# Patient Record
Sex: MALE | URBAN - METROPOLITAN AREA
[De-identification: names, ages, dates, MRNs, and addresses within clinical notes are randomized per-mention and may not be internally consistent; named-entity substitution may affect disease eponyms.]

---

## 2024-11-18 ENCOUNTER — HOSPITAL ENCOUNTER (INPATIENT)
Age: 19
LOS: 4 days | Discharge: HOME | End: 2024-11-22
Attending: EMERGENCY MEDICINE | Admitting: PSYCHIATRY & NEUROLOGY
Payer: MEDICAID

## 2024-11-18 DIAGNOSIS — R45.851 DEPRESSION WITH SUICIDAL IDEATION: Primary | ICD-10-CM

## 2024-11-18 DIAGNOSIS — F32.A DEPRESSION WITH SUICIDAL IDEATION: Primary | ICD-10-CM

## 2024-11-18 LAB
ALBUMIN SERPL-MCNC: 4.1 G/DL (ref 3.5–5.2)
ALP SERPL-CCNC: 51 U/L (ref 40–129)
ALT SERPL-CCNC: 40 U/L (ref 10–50)
AMPHET UR QL SCN: NEGATIVE
ANION GAP SERPL CALCULATED.3IONS-SCNC: 11 MMOL/L (ref 9–16)
APAP SERPL-MCNC: <5 UG/ML (ref 10–30)
AST SERPL-CCNC: 36 U/L (ref 10–50)
BARBITURATES UR QL SCN: NEGATIVE
BASOPHILS # BLD: 0 K/UL (ref 0–0.2)
BASOPHILS NFR BLD: 1 % (ref 0–2)
BENZODIAZ UR QL: NEGATIVE
BILIRUB SERPL-MCNC: <0.2 MG/DL (ref 0–1.2)
BUN SERPL-MCNC: 15 MG/DL (ref 6–20)
CALCIUM SERPL-MCNC: 9.5 MG/DL (ref 8.6–10.4)
CANNABINOIDS UR QL SCN: NEGATIVE
CHLORIDE SERPL-SCNC: 104 MMOL/L (ref 98–107)
CO2 SERPL-SCNC: 23 MMOL/L (ref 20–31)
COCAINE UR QL SCN: NEGATIVE
CREAT SERPL-MCNC: 0.8 MG/DL (ref 0.7–1.2)
EOSINOPHIL # BLD: 0.3 K/UL (ref 0–0.4)
EOSINOPHILS RELATIVE PERCENT: 4 % (ref 0–4)
ERYTHROCYTE [DISTWIDTH] IN BLOOD BY AUTOMATED COUNT: 13.6 % (ref 11.5–14.9)
ETHANOL PERCENT: NORMAL %
ETHANOLAMINE SERPL-MCNC: <10 MG/DL (ref 0–0.08)
FENTANYL UR QL: NEGATIVE
GFR, ESTIMATED: >90 ML/MIN/1.73M2
GLUCOSE SERPL-MCNC: 105 MG/DL (ref 74–99)
HCT VFR BLD AUTO: 41.7 % (ref 41–53)
HGB BLD-MCNC: 14.4 G/DL (ref 13.5–17.5)
LYMPHOCYTES NFR BLD: 2.6 K/UL (ref 1–4.8)
LYMPHOCYTES RELATIVE PERCENT: 38 % (ref 25–45)
MAGNESIUM SERPL-MCNC: 2 MG/DL (ref 1.6–2.6)
MCH RBC QN AUTO: 31.1 PG (ref 26–34)
MCHC RBC AUTO-ENTMCNC: 34.5 G/DL (ref 31–37)
MCV RBC AUTO: 90.2 FL (ref 80–100)
METHADONE UR QL: NEGATIVE
MONOCYTES NFR BLD: 0.5 K/UL (ref 0.1–1.3)
MONOCYTES NFR BLD: 7 % (ref 2–8)
NEUTROPHILS NFR BLD: 50 % (ref 34–64)
NEUTS SEG NFR BLD: 3.4 K/UL (ref 1.3–9.1)
OPIATES UR QL SCN: NEGATIVE
OXYCODONE UR QL SCN: NEGATIVE
PCP UR QL SCN: NEGATIVE
PLATELET # BLD AUTO: 299 K/UL (ref 150–450)
PMV BLD AUTO: 8.3 FL (ref 6–12)
POTASSIUM SERPL-SCNC: 4.2 MMOL/L (ref 3.7–5.3)
PROT SERPL-MCNC: 7.7 G/DL (ref 6.6–8.7)
RBC # BLD AUTO: 4.62 M/UL (ref 4.5–5.9)
SALICYLATES SERPL-MCNC: <1 MG/DL (ref 0–10)
SODIUM SERPL-SCNC: 138 MMOL/L (ref 136–145)
TEST INFORMATION: NORMAL
WBC OTHER # BLD: 6.8 K/UL (ref 4.5–13.5)

## 2024-11-18 PROCEDURE — 85025 COMPLETE CBC W/AUTO DIFF WBC: CPT

## 2024-11-18 PROCEDURE — 1240000000 HC EMOTIONAL WELLNESS R&B

## 2024-11-18 PROCEDURE — 80179 DRUG ASSAY SALICYLATE: CPT

## 2024-11-18 PROCEDURE — 80143 DRUG ASSAY ACETAMINOPHEN: CPT

## 2024-11-18 PROCEDURE — 80307 DRUG TEST PRSMV CHEM ANLYZR: CPT

## 2024-11-18 PROCEDURE — 80053 COMPREHEN METABOLIC PANEL: CPT

## 2024-11-18 PROCEDURE — 83735 ASSAY OF MAGNESIUM: CPT

## 2024-11-18 PROCEDURE — 99285 EMERGENCY DEPT VISIT HI MDM: CPT

## 2024-11-18 PROCEDURE — G0480 DRUG TEST DEF 1-7 CLASSES: HCPCS

## 2024-11-18 PROCEDURE — 36415 COLL VENOUS BLD VENIPUNCTURE: CPT

## 2024-11-18 RX ORDER — DIPHENHYDRAMINE HYDROCHLORIDE 50 MG/ML
50 INJECTION INTRAMUSCULAR; INTRAVENOUS EVERY 6 HOURS PRN
Status: DISCONTINUED | OUTPATIENT
Start: 2024-11-18 | End: 2024-11-22 | Stop reason: HOSPADM

## 2024-11-18 RX ORDER — TRAZODONE HYDROCHLORIDE 50 MG/1
50 TABLET, FILM COATED ORAL NIGHTLY PRN
Status: DISCONTINUED | OUTPATIENT
Start: 2024-11-18 | End: 2024-11-22 | Stop reason: HOSPADM

## 2024-11-18 RX ORDER — POLYETHYLENE GLYCOL 3350 17 G/17G
17 POWDER, FOR SOLUTION ORAL DAILY PRN
Status: DISCONTINUED | OUTPATIENT
Start: 2024-11-18 | End: 2024-11-22 | Stop reason: HOSPADM

## 2024-11-18 RX ORDER — IBUPROFEN 400 MG/1
400 TABLET, FILM COATED ORAL EVERY 6 HOURS PRN
Status: DISCONTINUED | OUTPATIENT
Start: 2024-11-18 | End: 2024-11-22 | Stop reason: HOSPADM

## 2024-11-18 RX ORDER — POLYETHYLENE GLYCOL 3350 17 G
2 POWDER IN PACKET (EA) ORAL
Status: DISCONTINUED | OUTPATIENT
Start: 2024-11-18 | End: 2024-11-18

## 2024-11-18 RX ORDER — MAGNESIUM HYDROXIDE/ALUMINUM HYDROXICE/SIMETHICONE 120; 1200; 1200 MG/30ML; MG/30ML; MG/30ML
30 SUSPENSION ORAL EVERY 6 HOURS PRN
Status: DISCONTINUED | OUTPATIENT
Start: 2024-11-18 | End: 2024-11-22 | Stop reason: HOSPADM

## 2024-11-18 RX ORDER — LORAZEPAM 2 MG/ML
2 INJECTION INTRAMUSCULAR EVERY 6 HOURS PRN
Status: DISCONTINUED | OUTPATIENT
Start: 2024-11-18 | End: 2024-11-22 | Stop reason: HOSPADM

## 2024-11-18 RX ORDER — LORAZEPAM 1 MG/1
2 TABLET ORAL EVERY 6 HOURS PRN
Status: DISCONTINUED | OUTPATIENT
Start: 2024-11-18 | End: 2024-11-22 | Stop reason: HOSPADM

## 2024-11-18 RX ORDER — HALOPERIDOL 5 MG/1
5 TABLET ORAL EVERY 6 HOURS PRN
Status: DISCONTINUED | OUTPATIENT
Start: 2024-11-18 | End: 2024-11-22 | Stop reason: HOSPADM

## 2024-11-18 RX ORDER — HALOPERIDOL 5 MG/ML
5 INJECTION INTRAMUSCULAR EVERY 6 HOURS PRN
Status: DISCONTINUED | OUTPATIENT
Start: 2024-11-18 | End: 2024-11-22 | Stop reason: HOSPADM

## 2024-11-18 RX ORDER — ACETAMINOPHEN 325 MG/1
650 TABLET ORAL EVERY 6 HOURS PRN
Status: DISCONTINUED | OUTPATIENT
Start: 2024-11-18 | End: 2024-11-22 | Stop reason: HOSPADM

## 2024-11-18 RX ORDER — HYDROXYZINE HYDROCHLORIDE 50 MG/1
50 TABLET, FILM COATED ORAL 3 TIMES DAILY PRN
Status: DISCONTINUED | OUTPATIENT
Start: 2024-11-18 | End: 2024-11-22 | Stop reason: HOSPADM

## 2024-11-18 ASSESSMENT — PATIENT HEALTH QUESTIONNAIRE - PHQ9
SUM OF ALL RESPONSES TO PHQ QUESTIONS 1-9: 2
1. LITTLE INTEREST OR PLEASURE IN DOING THINGS: SEVERAL DAYS
2. FEELING DOWN, DEPRESSED OR HOPELESS: SEVERAL DAYS
SUM OF ALL RESPONSES TO PHQ QUESTIONS 1-9: 2
SUM OF ALL RESPONSES TO PHQ9 QUESTIONS 1 & 2: 2

## 2024-11-18 ASSESSMENT — ENCOUNTER SYMPTOMS
SHORTNESS OF BREATH: 0
COLOR CHANGE: 0
BACK PAIN: 0
EYE PAIN: 0
ABDOMINAL PAIN: 0

## 2024-11-18 ASSESSMENT — PAIN - FUNCTIONAL ASSESSMENT: PAIN_FUNCTIONAL_ASSESSMENT: NONE - DENIES PAIN

## 2024-11-18 ASSESSMENT — LIFESTYLE VARIABLES
HOW MANY STANDARD DRINKS CONTAINING ALCOHOL DO YOU HAVE ON A TYPICAL DAY: PATIENT DOES NOT DRINK
HOW OFTEN DO YOU HAVE A DRINK CONTAINING ALCOHOL: NEVER

## 2024-11-18 ASSESSMENT — SLEEP AND FATIGUE QUESTIONNAIRES
DO YOU USE A SLEEP AID: NO
DO YOU HAVE DIFFICULTY SLEEPING: NO
AVERAGE NUMBER OF SLEEP HOURS: 6

## 2024-11-18 NOTE — ED NOTES
Provisional Diagnosis:   Depression with SI     Psychosocial and Contextual Factors:   Patient presents at the ED via [employer] on a pink slip due to suicidal ideations.    C-SSRS Summary:  X    Patient: X  Family:    Agency: X    Substance Abuse: Pt denies    Present Suicidal Behavior:  X    Verbal: X    Attempt:     Past Suicidal Behavior: X    Verbal:X    Attempt:X      Self-Injurious/Self-Mutilation:       Violence Current or Past        Trauma Identified:       Protective Factors:           Risk Factors:           Clinical Summary:    Soren Allen is a 19 y.o. male who presents at the ED via [employer] on a pink slip due to suicidal ideations.    Per pink slip,   \"Clt has been screened by crisis 3X in past 24 hours due to severe depression and suicidal ideations. Clt was seen by NP of [facility] psychiatric urgent care and hospitalization was recommended. Clt presents with anhedonia, worthlessness, hopelessness, and helpless as he does not believe his mood circumstances will ever improve and expressing that he wants to die. Clt identified two methods that are highly lethal - collision w/ train and shooting self w/ firearm. While clt does not possess a firearm [place] train tracks. Clt expressed that he is likely to act on suicidal urges in near future and was unable to identity reasons for living, available supports, and alternative behaviors. Clt is concerned how inpatient tx may impact school/work and has motive to minimize severity of risk. Clt has hx of three prior attempts with last occurring in 07/2024 and didn't continue with MH tx after admission to [facility] then. Clt states there is no point is continuing to suffer as he does not believe positive change can occur. Clt is assessed as high risk to self and hospitalization is recommended.\"     Patient reports suicidal ideations. Patient stated he would use a gun or jump in front of a train to kill himself. Patient reports he has been having increase  depression for the last two months and reported suicidal ideations on and off but has not been able to cope anymore. Patient called police. Patient reports that he has some personal things going on and did not want to discuss with ED physician and SW.     Patient denies taking any psychiatric medication and denies following with any outpatient mental health provider. Patient stated he saw a therapist around 2 months ago that was in private practice.     Patient goes to [employer] for college. Patient from [place] originally.     Patient has never been admitted to [facility]. Patient reports being admitted somewhere else in the past from an attempt of cutting.     Patient presents with a low affect.     Patient denies homicidal ideations. Patient denies auditory and visual hallucinations.     Level of Care Disposition:    Writer consulted with Dr. Canales who recommends inpatient psychiatric admission for safety and stabilization. Patient admitted via pink slip.

## 2024-11-18 NOTE — ED PROVIDER NOTES
EMERGENCY DEPARTMENT ENCOUNTER    Pt Name: Soren Allen  MRN: [id number]  Birthdate 2005  Date of evaluation: 11/18/24  CHIEF COMPLAINT       Chief Complaint   Patient presents with    Mental Health Problem     HISTORY OF PRESENT ILLNESS   19-year-old male presents for mental health evaluation.  Patient reports worsening depression recently states that 3 due to personal issues states that he has been feeling suicidal had a plan to jump in front of a train.  He denies homicidal ideation denies visual or auditory elucidation's denies recent drug or alcohol use denies any current medical complaints    The history is provided by the patient.           REVIEW OF SYSTEMS     Review of Systems   Constitutional:  Negative for chills and fever.   HENT:  Negative for congestion and ear pain.    Eyes:  Negative for pain.   Respiratory:  Negative for shortness of breath.    Cardiovascular:  Negative for chest pain, palpitations and leg swelling.   Gastrointestinal:  Negative for abdominal pain.   Genitourinary:  Negative for dysuria and flank pain.   Musculoskeletal:  Negative for back pain.   Skin:  Negative for color change.   Neurological:  Negative for numbness and headaches.   Psychiatric/Behavioral:  Positive for suicidal ideas. Negative for confusion.    All other systems reviewed and are negative.    PASTMEDICAL HISTORY   History reviewed. No pertinent past medical history.  Past Problem List  Patient Active Problem List   Diagnosis Code    Depression with suicidal ideation F32.A, R45.851     SURGICAL HISTORY     History reviewed. No pertinent surgical history.  CURRENT MEDICATIONS       There are no discharge medications for this patient.    ALLERGIES     has No Known Allergies.  FAMILY HISTORY     has no family status information on file.      SOCIAL HISTORY       Social History     Tobacco Use    Smoking status: Never    Smokeless tobacco: Never   Vaping Use    Vaping status: Never Used   Substance Use Topics

## 2024-11-19 PROBLEM — F33.2 SEVERE EPISODE OF RECURRENT MAJOR DEPRESSIVE DISORDER, WITHOUT PSYCHOTIC FEATURES (HCC): Status: ACTIVE | Noted: 2024-11-19

## 2024-11-19 PROCEDURE — 6370000000 HC RX 637 (ALT 250 FOR IP): Performed by: PSYCHIATRY & NEUROLOGY

## 2024-11-19 PROCEDURE — 99223 1ST HOSP IP/OBS HIGH 75: CPT | Performed by: PSYCHIATRY & NEUROLOGY

## 2024-11-19 PROCEDURE — 99222 1ST HOSP IP/OBS MODERATE 55: CPT | Performed by: INTERNAL MEDICINE

## 2024-11-19 PROCEDURE — 1240000000 HC EMOTIONAL WELLNESS R&B

## 2024-11-19 PROCEDURE — APPSS60 APP SPLIT SHARED TIME 46-60 MINUTES: Performed by: NURSE PRACTITIONER

## 2024-11-19 RX ADMIN — TRAZODONE HYDROCHLORIDE 50 MG: 50 TABLET ORAL at 21:11

## 2024-11-19 RX ADMIN — HYDROXYZINE HYDROCHLORIDE 50 MG: 50 TABLET, FILM COATED ORAL at 21:11

## 2024-11-19 RX ADMIN — SERTRALINE 50 MG: 50 TABLET, FILM COATED ORAL at 15:17

## 2024-11-19 ASSESSMENT — LIFESTYLE VARIABLES
HOW MANY STANDARD DRINKS CONTAINING ALCOHOL DO YOU HAVE ON A TYPICAL DAY: PATIENT DOES NOT DRINK
HOW OFTEN DO YOU HAVE A DRINK CONTAINING ALCOHOL: NEVER
HOW MANY STANDARD DRINKS CONTAINING ALCOHOL DO YOU HAVE ON A TYPICAL DAY: PATIENT DOES NOT DRINK
HOW OFTEN DO YOU HAVE A DRINK CONTAINING ALCOHOL: NEVER

## 2024-11-19 NOTE — PLAN OF CARE
Problem: Risk for Elopement  Goal: Patient will not exit the unit/facility without proper excort  Note: Mr. Allen has made no attempts to elope during this shift. He has remained isolative to his room throughout this shift.      Problem: Depression/Self Harm  Goal: Effect of psychiatric condition will be minimized and patient will be protected from self harm  Description: INTERVENTIONS:  1. Assess impact of patient's symptoms on level of functioning, self care needs and offer support as indicated  2. Assess patient/family knowledge of depression, impact on illness and need for teaching  3. Provide emotional support, presence and reassurance  4. Assess for possible suicidal thoughts or ideation. If patient expresses suicidal thoughts or statements do not leave alone, initiate Suicide Precautions, move to a room close to the nursing station and obtain sitter  5. Initiate consults as appropriate with Mental Health Professional, Spiritual Care, Psychosocial CNS, and consider a recommendation to the LIP for a Psychiatric Consultation  Note: Mr. Allen endorses feeling depressed. He was accepting of the Zoloft he was prescribed. Writer educated Mr. Allen about medication, he verbalized understanding.      Problem: Pain  Goal: Verbalizes/displays adequate comfort level or baseline comfort level  Note: Patient denies pain and discomfort.

## 2024-11-19 NOTE — PLAN OF CARE
[facility]  Initial Interdisciplinary Treatment Plan NO      Original treatment plan Date & Time: 11/19/2024 0930    Admission Type:  Admission Type: Involuntary    Reason for admission:   Reason for Admission: Patient came in pink slipped from [facility], signed in on admit, patient has been feeling suicidal with a plan to use a gun or get hit by a train. Patient reports being unable to cope and also admits to homicidal ideation toward no one in specific. Patient has had multiple attempts in the past and has history of cutting self.    Estimated Length of Stay:  5-7days  Estimated Discharge Date: to be determined by physician    PATIENT STRENGTHS:  Patient Strengths:   Patient Strengths and Limitations:Limitations: General negative or hopeless attitude about future/recovery, Difficulty problem solving/relies on others to help solve problems  Addictive Behavior: Addictive Behavior  In the Past 3 Months, Have You Felt or Has Someone Told You That You Have a Problem With  : None  Medical Problems:History reviewed. No pertinent past medical history.  Status EXAM:Mental Status and Behavioral Exam  Normal: No  Level of Assistance: Independent/Self  Facial Expression: Avoids Gaze, Flat, Expressionless  Affect: Blunt  Level of Consciousness: Alert  Frequency of Checks: 4 times per hour, close  Mood:Normal: No  Mood: Depressed, Empty, Helpless  Motor Activity:Normal: Yes  Eye Contact: Poor  Observed Behavior: Withdrawn, Cooperative, Guarded, Preoccupied  Sexual Misconduct History: Current - no  Preception: [place] to person, [place] to time, [place] to place, [place] to situation  Attention:Normal: No  Attention: Distractible  Thought Processes: Unremarkable  Thought Content:Normal: No  Thought Content: Poverty of content  Depression Symptoms: Feelings of helplessness, Feelings of hopelessess, Loss of interest, Impaired concentration  Anxiety Symptoms: Generalized  Naya Symptoms: No problems reported or  observed.  Hallucinations: None  Delusions: No  Memory:Normal: Yes  Insight and Judgment: No  Insight and Judgment: Poor judgment, Poor insight    EDUCATION:   Learner Progress Toward Treatment Goals: reviewed group plans and strategies for care    Method:group therapy, medication compliance, individualized assessments and care planning    Outcome: needs reinforcement    PATIENT GOALS: to be discussed with patient within 72 hours    PLAN/TREATMENT RECOMMENDATIONS:     continue group therapy , medications compliance, goal setting, individualized assessments and care, continue to monitor pt on unit      SHORT-TERM GOALS:   Time frame for Short-Term Goals: 5-7 days    LONG-TERM GOALS:  Time frame for Long-Term Goals: 6 months  Members Present in Team Meeting: See Signature Sheet    LYNDON FRANCOIS RN

## 2024-11-19 NOTE — CARE COORDINATION
[facility] Biopsychosocial Assessment    Current Level of Psychosocial Functioning     Independent   Dependent    Minimal Assist X       Psychosocial High Risk Factors (check all that apply)    Unable to obtain meds   Chronic illness/pain    Substance abuse   Lack of Family Support   Financial stress   Isolation X   Inadequate Community Resources   Suicide attempt(s)   Not taking medications X  Victim of crime   Developmental Delay  Unable to manage personal needs  X  Age 65 or older   Homeless   No transportation   Readmission within 30 days  Unemployment  Traumatic Event    Psychiatric Advanced Directives: none reported     Family to Involve in Treatment: Patient reports his mom is supportive and involved in his care     Sexual Orientation:  DAMIR    Patient Strengths: adequate housing, student at [employer], insurance, family support, linked with [facility]     Patient Barriers: presenting on admission with suicidal ideation, increase in symptoms of Depression, not taking Psychiatric medications    Opiate Education Provided: N/A Patient denies and does not have any documented history of Opiate or Heroin use/abuse.     CMHC/mental health history: [employer] student, stable housing in [place] [facility] [place], operated by [facility] on campus in [place], follows up at [facility]     Plan of Care   medication management, group/individual therapies, family meetings, psycho -education, treatment team meetings to assist with stabilization    Initial Discharge Plan:  Patient reports a plan to return to his apartment in [place] and to follow up with outpatient treatment at [facility].     Clinical Summary:  Soren Allen is a 19 y.o. male who presents on admission with suicidal ideation. It was documented in patient's chart that he was brought into the ED by [employer] police. It was also documented that patient was seen by [facility] Psychiatric urgent care who recommended hospitalization. Patient is reporting a recent increase

## 2024-11-19 NOTE — PROGRESS NOTES
Pharmacy Medication History Note      List of current medications patient is taking is complete.    Source of information: patient, [employer] Scripts, OARRS, Care Everywhere     Changes made to medication list:  Medications removed (include reason, ex. therapy complete or physician discontinued, noncompliance):  None     Medications flagged for provider review:  None     Medications added/doses adjusted:  None     Other notes (ex. Recent course of antibiotics, Coumadin dosing):  OARRS negative   Patient denies taking prescription, OTC, herbal, or illicit medications.       Current Home Medication List at Time of Admission:  Prior to Admission medications    Not on File         Please let me know if you have any questions about this encounter. Thank you!    Electronically signed by Molly Mckeon RPH on 11/18/2024 at 7:15 PM

## 2024-11-19 NOTE — GROUP NOTE
Group Therapy Note    Date: 11/19/2024    Group Start Time: 1100  Group End Time: 1150  Group Topic: Cognitive Skills    Anita Carlisle CTRS        Group Therapy Note    Attendees: 9/20     Topic: To increase socialization, practice decision making skills, and concentration, and explore perception.       Comments:   Patient did not participate in Cogniotive Skills Group, at 11:00, despite staff encouragement   and explanation of benefits. Pt was seclusive to self et room.     Q15 minute safety checks maintained for patient safety and will continue to encourage   patient to attend unit programming.       Discipline Responsible: Psychoeducational Specialist   Signature: AGUSTÍN CARROLL

## 2024-11-19 NOTE — H&P
[facility] Internal Medicine  Callum King MD; Ray Olivia MD, Alvaro Rebollar MD, Violet Nina MD, Kalani De La Cruz MD; Jami Johansen MD    [facility] Internal Medicine   IN-PATIENT SERVICE   [facility]     HISTORY AND PHYSICAL EXAMINATION            Date:   11/19/2024  Patient name:  Soren Allen  Date of admission:  11/18/2024  5:27 PM  MRN:   [id number]  Account:  [id number]  YOB: 2005  PCP:    No primary care provider on file.  Room:   [address] [place] [address]  Code Status:    Full Code      Chief Complaint:     Mild intermittent asthma    History Obtained From:     Patient/EMR/bedside RN     History of Present Illness:   19-year-old -American gentleman class I obese BMI 30 history of mild intermittent asthma not in exacerbation denies any dyspnea with exam  No significant weight loss or weight gain recently no history of HIV tuberculosis no history of hyper or hypothyroidism      Past Medical History:     History reviewed. No pertinent past medical history.     Past Surgical History:     History reviewed. No pertinent surgical history.     Medications Prior to Admission:     Prior to Admission medications    Not on File        Allergies:     Patient has no known allergies.    Social History:     Tobacco:    reports that he has never smoked. He has never used smokeless tobacco.  Alcohol:      reports that he does not currently use alcohol.  Drug Use:  reports no history of drug use.    Family History:     History reviewed. No pertinent family history.    Review of Systems:     Positive and Negative as described in HPI.    CONSTITUTIONAL:  negative for fevers, chills, sweats, fatigue, weight loss  HEENT:  negative for vision, hearing changes, runny nose, throat pain  RESPIRATORY:  negative for shortness of breath, cough, congestion, wheezing.  CARDIOVASCULAR:  negative for chest pain, palpitations.  GASTROINTESTINAL:  negative for

## 2024-11-19 NOTE — GROUP NOTE
Group Therapy Note    Date: 11/19/2024    Group Start Time: 1000  Group End Time: 1030  Group Topic: Psychoeducation    Raul Stern        Group Therapy Note    Attendees: 6/22   Patient was offered group therapy today but declined to participate despite encouragement from staff. 1:1 was offered.      Signature:  Raul Neumann
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
## 2024-11-19 NOTE — BH NOTE
[facility]  Admission Note     Admission Type:   Involuntary - Signed in Upon Admission    Reason for admission:  Reason for Admission: Patient came in pink slipped from [facility], signed in on admit, patient has been feeling suicidal with a plan to use a gun or get hit by a train. Patient reports being unable to cope and also admits to homicidal ideation toward no one in specific. Patient has had multiple attempts in the past and has history of cutting self.      Addictive Behavior:   Addictive Behavior  In the Past 3 Months, Have You Felt or Has Someone Told You That You Have a Problem With  : None    Medical Problems:   History reviewed. No pertinent past medical history.    Status EXAM:  Mental Status and Behavioral Exam  Normal: No  Level of Assistance: Independent/Self  Facial Expression: Avoids Gaze, Flat, Expressionless  Affect: Blunt  Level of Consciousness: Alert  Frequency of Checks: 4 times per hour, close  Mood:Normal: No  Mood: Depressed, Empty, Helpless  Motor Activity:Normal: Yes  Eye Contact: Poor  Observed Behavior: Withdrawn, Cooperative, Guarded, Preoccupied  Sexual Misconduct History: Current - no  Preception: [place] to person, [place] to time, [place] to place, [place] to situation  Attention:Normal: No  Attention: Distractible  Thought Processes: Unremarkable  Thought Content:Normal: No  Thought Content: Poverty of content  Depression Symptoms: Feelings of helplessness, Feelings of hopelessess, Loss of interest, Impaired concentration  Anxiety Symptoms: Generalized  Naya Symptoms: No problems reported or observed.  Hallucinations: None  Delusions: No  Memory:Normal: Yes  Insight and Judgment: No  Insight and Judgment: Poor judgment, Poor insight    Tobacco Screening:  Practical Counseling, on admission, duy X, if applicable and completed (first 3 are required if patient doesn't refuse):            ( ) Recognizing danger situations (included triggers and roadblocks)

## 2024-11-19 NOTE — PROGRESS NOTES
Behavioral Services  Medicare Certification Upon Admission    I certify that this patient's inpatient psychiatric hospital admission is medically necessary for:    [x] (1) Treatment which could reasonably be expected to improve this patient's condition,       [x] (2) Or for diagnostic study;     AND     [x](2) The inpatient psychiatric services are provided while the individual is under the care of a physician and are included in the individualized plan of care.    Estimated length of stay/service 2-9 days    Plan for post-hospital care -outpatient care    Electronically signed by SIERRA CODY MD on 11/19/2024 at 9:20 AM

## 2024-11-20 PROCEDURE — 6370000000 HC RX 637 (ALT 250 FOR IP): Performed by: PSYCHIATRY & NEUROLOGY

## 2024-11-20 PROCEDURE — 99232 SBSQ HOSP IP/OBS MODERATE 35: CPT | Performed by: PSYCHIATRY & NEUROLOGY

## 2024-11-20 PROCEDURE — 1240000000 HC EMOTIONAL WELLNESS R&B

## 2024-11-20 RX ADMIN — SERTRALINE 50 MG: 50 TABLET, FILM COATED ORAL at 12:05

## 2024-11-20 RX ADMIN — HYDROXYZINE HYDROCHLORIDE 50 MG: 50 TABLET, FILM COATED ORAL at 12:05

## 2024-11-20 NOTE — GROUP NOTE
Group Therapy Note    Date: 11/20/2024    Group Start Time: 1000  Group End Time: 1045  Group Topic: Psychoeducation    Raul Stern        Group Therapy Note    Attendees: 10/20     Patient was offered group therapy today but declined to participate despite encouragement from staff. 1:1 was offered.    Signature:  Raul Neumann

## 2024-11-20 NOTE — PLAN OF CARE
Problem: Risk for Elopement  Goal: Patient will not exit the unit/facility without proper excort  Outcome: Progressing  Flowsheets (Taken 11/20/2024 1644)  Nursing Interventions for Elopement Risk:   Shoes and clothing collected and placed in gown attire   Make sure patient has all necessary personal care items  Note: No exit seeking behaviors noted. Will continue to monitor Q 15 minutes and intermittently.      Problem: Depression/Self Harm  Goal: Effect of psychiatric condition will be minimized and patient will be protected from self harm  Description: INTERVENTIONS:  1. Assess impact of patient's symptoms on level of functioning, self care needs and offer support as indicated  2. Assess patient/family knowledge of depression, impact on illness and need for teaching  3. Provide emotional support, presence and reassurance  4. Assess for possible suicidal thoughts or ideation. If patient expresses suicidal thoughts or statements do not leave alone, initiate Suicide Precautions, move to a room close to the nursing station and obtain sitter  5. Initiate consults as appropriate with Mental Health Professional, Spiritual Care, Psychosocial CNS, and consider a recommendation to the LIP for a Psychiatric Consultation  Flowsheets (Taken 11/20/2024 1658)  Effect of psychiatric condition will be minimized and patient will be protected from self harm:   Provide emotional support, presence and reassurance   Assess for suicidal thoughts or ideation. If patient expresses suicidal thoughts or statements do not leave alone, initiate Suicide Precautions, move near nurse station, obtain sitter  Note: No self harm noted this shift. Patient admitted to having fleeting suicidal thoughts with no plan while inpatient however has a plan if released from the hospital. Patient contracts for safety on the unit and agrees to notify nursing staff if feeling unsafe or overwhelmed at any time. Patient is isolative to room, not interactive

## 2024-11-20 NOTE — GROUP NOTE
Group Therapy Note    Date: 11/20/2024    Group Start Time: 1100  Group End Time: 1145  Group Topic: Cognitive Skills    Anita Carlisle CTRS        Group Therapy Note    Attendees: 7/19     Topic: To increase socialization, practice problem solving and deductive reasoning r/t task.       Comments:   Patient did not participate in Cognitive Skills Group, at 11:00, despite staff encouragement   and explanation of benefits. Pt was seclusive to self et room.     Q15 minute safety checks maintained for patient safety and will continue to encourage   patient to attend unit programming.       Discipline Responsible: Psychoeducational Specialist   Signature: AGUSTÍN CARROLL

## 2024-11-20 NOTE — GROUP NOTE
Group Therapy Note    Date: 11/20/2024    Group Start Time: 0900  Group End Time: 0915  Group Topic: Community Meeting    Emily Raya      Community Meeting Group Note        Date: 11/20/2024 Start Time: 9am  End Time: 0915      Number of Participants in Group & Unit Census:  7/20        Goal of Group: To discuss daily goal      Comments:     Patient did not participate in Community Meeting group, despite staff encouragement and explanation of benefits.  Patient remain seclusive to self.  Q15 minute safety checks maintained for patient safety and will continue to encourage patient to attend unit programming.          Signature:  Emily Aviles

## 2024-11-20 NOTE — GROUP NOTE
Group Therapy Note    Date: 11/20/2024    Group Start Time: 1315  Group End Time: 1420  Group Topic: Cognitive Skills    Anita Carlisle CTRS        Group Therapy Note    Attendees: 4/17     Topic: To increase socialization, practice self expression, explore stress management r/t environment/  activities/boundaries/resources r/t the senses, using creative expression and discussion.       Comments:   Patient did not participate in Cognitive Skills Group, at 13:15, despite staff encouragement   and explanation of benefits. Pt was seclusive to self et room.     Q15 minute safety checks maintained for patient safety and will continue to encourage   patient to attend unit programming.       Discipline Responsible: Psychoeducational Specialist   Signature: AGUSTÍN CARROLL

## 2024-11-20 NOTE — PLAN OF CARE
Problem: Depression/Self Harm  Goal: Effect of psychiatric condition will be minimized and patient will be protected from self harm  Description: INTERVENTIONS:  1. Assess impact of patient's symptoms on level of functioning, self care needs and offer support as indicated  2. Assess patient/family knowledge of depression, impact on illness and need for teaching  3. Provide emotional support, presence and reassurance  4. Assess for possible suicidal thoughts or ideation. If patient expresses suicidal thoughts or statements do not leave alone, initiate Suicide Precautions, move to a room close to the nursing station and obtain sitter  5. Initiate consults as appropriate with Mental Health Professional, Spiritual Care, Psychosocial CNS, and consider a recommendation to the LIP for a Psychiatric Consultation  11/19/2024 2334 by Mehnaz Kelly, RN  Outcome: Progressing  Note: Patient denies thoughts of self harm and depressive thoughts. Patient stated he would not let staff know if thoughts arise. Patient has been flat, blunt and isolative to room. Writer will continue to monitor patient status along with every 15 minute checks      Problem: Pain  Goal: Verbalizes/displays adequate comfort level or baseline comfort level  11/19/2024 2334 by Mehnaz Kelly, RAJAN  Outcome: Progressing  Note: Patient does not endorse any pain. Patient was informed by writer if pain does occur to notify writer or staff to get resolved. Writer will continue to monitor patient status.

## 2024-11-21 PROCEDURE — 99232 SBSQ HOSP IP/OBS MODERATE 35: CPT | Performed by: PSYCHIATRY & NEUROLOGY

## 2024-11-21 PROCEDURE — 1240000000 HC EMOTIONAL WELLNESS R&B

## 2024-11-21 PROCEDURE — 6370000000 HC RX 637 (ALT 250 FOR IP): Performed by: PSYCHIATRY & NEUROLOGY

## 2024-11-21 RX ADMIN — TRAZODONE HYDROCHLORIDE 50 MG: 50 TABLET ORAL at 21:13

## 2024-11-21 RX ADMIN — SERTRALINE 50 MG: 50 TABLET, FILM COATED ORAL at 13:44

## 2024-11-21 RX ADMIN — HYDROXYZINE HYDROCHLORIDE 50 MG: 50 TABLET, FILM COATED ORAL at 18:01

## 2024-11-21 NOTE — GROUP NOTE
Group Therapy Note    Date: 11/21/2024    Group Start Time: 1100  Group End Time: 1150  Group Topic: Cognitive Skills    Anita Carlisle CTRS        Group Therapy Note    Attendees: 9/19     Topic: To increase socialization, practice problem solving and deductive reasoning r/t task.       Comments:   Patient did not participate in Cognitive Skills Group, at 11:00, despite staff encouragement   and explanation of benefits. Pt was seclusive to self et room.     Q15 minute safety checks maintained for patient safety and will continue to encourage   patient to attend unit programming.       Discipline Responsible: Psychoeducational Specialist   Signature: AGUSTÍN CARROLL

## 2024-11-21 NOTE — PROGRESS NOTES
BEHAVIORAL HEALTH FOLLOW-UP NOTE     11/20/2024     Patient was seen and examined in person, Chart reviewed   Patient's case discussed with staff/team    Chief Complaint: Depression    Interim History:     The patient has been isolated.  He continues to be depressed.  He has been taking his medication and reports no side effects.  He continues to have suicidal thoughts.  No changes have been made to his medications today.  I encouraged him to participate in groups      /81   Pulse 86   Temp 97.7 °F (36.5 °C) (Temporal)   Resp 16   Ht 1.905 m (6' 3\")   Wt 108.9 kg (240 lb)   SpO2 100%   BMI 30.00 kg/m²   Appetite:   [x] Normal/Unchanged  [] Increased  [] Decreased      Sleep:       [] Normal/Unchanged  [x] Fair       [] Poor              Energy:    [] Normal/Unchanged  [] Increased  [x] Decreased        Aggression:  [] yes  [x] no    Patient is [] able  [] unable to CONTRACT FOR SAFETY ON THE UNIT    PAST MEDICAL/PSYCHIATRIC HISTORY:   History reviewed. No pertinent past medical history.    FAMILY/SOCIAL HISTORY:  History reviewed. No pertinent family history.  Social History     Socioeconomic History    Marital status: Single     Spouse name: Not on file    Number of children: Not on file    Years of education: Not on file    Highest education level: Not on file   Occupational History    Not on file   Tobacco Use    Smoking status: Never    Smokeless tobacco: Never   Vaping Use    Vaping status: Never Used   Substance and Sexual Activity    Alcohol use: Not Currently     Comment: Rarely    Drug use: Never    Sexual activity: Not on file   Other Topics Concern    Not on file   Social History Narrative    Not on file     Social Determinants of Health     Financial Resource Strain: Not on file   Food Insecurity: Patient Declined (11/18/2024)    Hunger Vital Sign     Worried About Running Out of Food in the Last Year: Patient declined     Ran Out of Food in the Last Year: Patient declined   Transportation

## 2024-11-21 NOTE — PLAN OF CARE
[facility]  Day 3 Interdisciplinary Treatment Plan NOTE    Review Date & Time: 11/21/2024   1245    Admission Type:   Admission Type: Involuntary    Reason for admission:  Reason for Admission: Patient came in pink slipped from [facility], signed in on admit, patient has been feeling suicidal with a plan to use a gun or get hit by a train. Patient reports being unable to cope and also admits to homicidal ideation toward no one in specific. Patient has had multiple attempts in the past and has history of cutting self.  Estimated Length of Stay: 5-7 days  Estimated Discharge Date Update: to be determined by physician    PATIENT STRENGTHS:  Patient Strengths    Patient Strengths and Limitations:Limitations: General negative or hopeless attitude about future/recovery, Difficulty problem solving/relies on others to help solve problems  Addictive Behavior:Addictive Behavior  In the Past 3 Months, Have You Felt or Has Someone Told You That You Have a Problem With  : None  Medical Problems:History reviewed. No pertinent past medical history.    Risk:  Fall Risk   Efraín Scale Efraín Scale Score: 21  BVC    Change in scores no Changes to plan of Care no    Status EXAM:   Mental Status and Behavioral Exam  Normal: No  Level of Assistance: Independent/Self  Facial Expression: Avoids Gaze, Flat  Affect: Blunt  Level of Consciousness: Alert  Frequency of Checks: 4 times per hour, close  Mood:Normal: No  Mood: Depressed, Anxious, Sad  Motor Activity:Normal: No  Motor Activity: Decreased  Eye Contact: Poor  Observed Behavior: Preoccupied, Guarded, Cooperative, Withdrawn  Sexual Misconduct History: Current - no  Preception: Bronson to person, Bronson to time, Bronson to place, Bronson to situation  Attention:Normal: No  Attention: Distractible  Thought Processes: Unremarkable  Thought Content:Normal: No  Thought Content: Preoccupations  Depression Symptoms: Change in energy level, Feelings of hopelessess, Feelings of  staying on medications.\"     PLAN/TREATMENT RECOMMENDATIONS UPDATE: continue with group therapies, increased socialization, continue planning for after discharge goals, continue with medication compliance    SHORT-TERM GOALS UPDATE:   Time frame for Short-Term Goals: 5-7 days    LONG-TERM GOALS UPDATE:   Time frame for Long-Term Goals: 6 months  Members Present in Team Meeting: See Signature Sheet    Deep Samuel, RN

## 2024-11-21 NOTE — GROUP NOTE
Group Therapy Note    Date: 11/21/2024    Group Start Time: 1000  Group End Time: 1030  Group Topic: Psychoeducation    Raul Stern        Group Therapy Note    Attendees: 5/18     Patient was offered group therapy today but declined to participate despite encouragement from staff. 1:1 was offered.  Signature:  Raul Neumann

## 2024-11-21 NOTE — GROUP NOTE
Group Therapy Note    Date: 11/21/2024    Group Start Time: 0930  Group End Time: 0940  Group Topic: Community Meeting    Kavita John, RN        Group Therapy Note    Attendees: 6/18     Patient refused to attend group at this time.

## 2024-11-21 NOTE — PROGRESS NOTES
BEHAVIORAL HEALTH FOLLOW-UP NOTE     11/21/2024     Patient was seen and examined in person, Chart reviewed   Patient's case discussed with staff/team    Chief Complaint: Depression    Interim History:       The patient has been feeling little better.  He is coming out of his room and is more.  He is also reading in his room.  His mood is gradually getting better.  He is uncertain about Zoloft because he has been on it before.  The patient would prefer to be on something that acts positive.  We discussed that most commonly prescribed antidepressants do take some time.        /77   Pulse 87   Temp 97.7 °F (36.5 °C) (Oral)   Resp 16   Ht 1.905 m (6' 3\")   Wt 108.9 kg (240 lb)   SpO2 100%   BMI 30.00 kg/m²   Appetite:   [x] Normal/Unchanged  [] Increased  [] Decreased      Sleep:       [] Normal/Unchanged  [x] Fair       [] Poor              Energy:    [] Normal/Unchanged  [] Increased  [x] Decreased        Aggression:  [] yes  [x] no    Patient is [] able  [] unable to CONTRACT FOR SAFETY ON THE UNIT    PAST MEDICAL/PSYCHIATRIC HISTORY:   History reviewed. No pertinent past medical history.    FAMILY/SOCIAL HISTORY:  History reviewed. No pertinent family history.  Social History     Socioeconomic History    Marital status: Single     Spouse name: Not on file    Number of children: Not on file    Years of education: Not on file    Highest education level: Not on file   Occupational History    Not on file   Tobacco Use    Smoking status: Never    Smokeless tobacco: Never   Vaping Use    Vaping status: Never Used   Substance and Sexual Activity    Alcohol use: Not Currently     Comment: Rarely    Drug use: Never    Sexual activity: Not on file   Other Topics Concern    Not on file   Social History Narrative    Not on file     Social Determinants of Health     Financial Resource Strain: Not on file   Food Insecurity: Patient Declined (11/18/2024)    Hunger Vital Sign     Worried About Running Out of Food in

## 2024-11-21 NOTE — PLAN OF CARE
Problem: Risk for Elopement  Goal: Patient will not exit the unit/facility without proper excort  11/21/2024 1439 by Vicky Montalvo LPN  Outcome: Progressing   Patient does not exhibit any exit seeking behavior  Problem: Depression/Self Harm  Goal: Effect of psychiatric condition will be minimized and patient will be protected from self harm  Description: INTERVENTIONS:  1. Assess impact of patient's symptoms on level of functioning, self care needs and offer support as indicated  2. Assess patient/family knowledge of depression, impact on illness and need for teaching  3. Provide emotional support, presence and reassurance  4. Assess for possible suicidal thoughts or ideation. If patient expresses suicidal thoughts or statements do not leave alone, initiate Suicide Precautions, move to a room close to the nursing station and obtain sitter  5. Initiate consults as appropriate with Mental Health Professional, Spiritual Care, Psychosocial CNS, and consider a recommendation to the LIP for a Psychiatric Consultation  11/21/2024 1439 by Vicky Montalvo LPN  Outcome: Progressing   Mr. Allen is seen in his room, affect is flat, he is friendly, guarded. Isolative to self, he comes out for meals and needs. Taking medications, patient feels like maybe he is sleeping too much, due to being bored. Encouraged to attend groups. Denies any thoughts to harm self.  Problem: Pain  Goal: Verbalizes/displays adequate comfort level or baseline comfort level  11/21/2024 1439 by Vicky Montalvo LPN  Outcome: Progressing   Patient denies any pain

## 2024-11-21 NOTE — GROUP NOTE
Group Therapy Note    Date: 11/21/2024    Group Start Time: 1435  Group End Time: 1550  Group Topic: Cognitive Skills    Anita Carlisle CTRS        Group Therapy Note    Attendees: 9/19     Topic: To increase socialization, practice self expression, explore feelings and positive   coping skills, resources, and barriers , using creative expression and discussion.         Comments:   Patient did not participate in Cognitive Skills Group, at 14:35, despite staff encouragement   and explanation of benefits. Pt was seclusive to self et room.     Q15 minute safety checks maintained for patient safety and will continue to encourage   patient to attend unit programming.       Discipline Responsible: Psychoeducational Specialist   Signature: AGUSTÍN CARROLL

## 2024-11-21 NOTE — PROGRESS NOTES
I independently saw and evaluated the patient.  I reviewed the  documentation above.  Any additional comments or changes to the    documentation are stated below otherwise agree with assessment.      The patient has been feeling little better.  He is coming out of his room and is more.  He is also reading in his room.  His mood is gradually getting better.  He is uncertain about Zoloft because he has been on it before.  The patient would prefer to be on something that acts positive.  We discussed that most commonly prescribed antidepressants do take some time.    No changes have been made to the patient's medications today.  Expected length of stay is 1-2 days  PLAN  Medications as noted above  Attempt to develop insight  Psycho-education conducted.  Supportive Therapy conducted.  Follow-up daily while on inpatient unit    Electronically signed by SIERRA CODY MD on 11/21/24 at 12:24 PM EST

## 2024-11-21 NOTE — PLAN OF CARE
Problem: Risk for Elopement  Goal: Patient will not exit the unit/facility without proper excort  11/20/2024 2112 by Scott Peralta LPN  Outcome: Progressing  Note: Patient has not attempted to exit the unit without the proper escort. Patient's body language and behavior shows no signs of risk for elopement at this time.      Problem: Depression/Self Harm  Goal: Effect of psychiatric condition will be minimized and patient will be protected from self harm  Description: INTERVENTIONS:  1. Assess impact of patient's symptoms on level of functioning, self care needs and offer support as indicated  2. Assess patient/family knowledge of depression, impact on illness and need for teaching  3. Provide emotional support, presence and reassurance  4. Assess for possible suicidal thoughts or ideation. If patient expresses suicidal thoughts or statements do not leave alone, initiate Suicide Precautions, move to a room close to the nursing station and obtain sitter  5. Initiate consults as appropriate with Mental Health Professional, Spiritual Care, Psychosocial CNS, and consider a recommendation to the LIP for a Psychiatric Consultation  11/20/2024 2112 by Scott Peralta LPN  Outcome: Progressing  Note: Patient denies any thoughts to harm self and no signs of self harm were noted. Patient encouraged to inform staff if he starts to develop any thoughts to harm self. Patient voiced understanding.      Problem: Pain  Goal: Verbalizes/displays adequate comfort level or baseline comfort level  Outcome: Progressing  Note: Patient denies any pain currently. Patient encouraged to inform staff if he develops any pain. Patient voiced understanding.

## 2024-11-22 VITALS
SYSTOLIC BLOOD PRESSURE: 107 MMHG | BODY MASS INDEX: 29.84 KG/M2 | DIASTOLIC BLOOD PRESSURE: 61 MMHG | HEIGHT: 75 IN | WEIGHT: 240 LBS | TEMPERATURE: 98.2 F | OXYGEN SATURATION: 100 % | HEART RATE: 77 BPM | RESPIRATION RATE: 14 BRPM

## 2024-11-22 PROCEDURE — 6370000000 HC RX 637 (ALT 250 FOR IP): Performed by: PSYCHIATRY & NEUROLOGY

## 2024-11-22 PROCEDURE — 99239 HOSP IP/OBS DSCHRG MGMT >30: CPT | Performed by: PSYCHIATRY & NEUROLOGY

## 2024-11-22 RX ORDER — TRAZODONE HYDROCHLORIDE 50 MG/1
50 TABLET, FILM COATED ORAL NIGHTLY PRN
Qty: 30 TABLET | Refills: 0 | Status: SHIPPED | OUTPATIENT
Start: 2024-11-22

## 2024-11-22 RX ORDER — HYDROXYZINE HYDROCHLORIDE 50 MG/1
50 TABLET, FILM COATED ORAL 3 TIMES DAILY PRN
Qty: 30 TABLET | Refills: 0 | Status: SHIPPED | OUTPATIENT
Start: 2024-11-22 | End: 2024-12-02

## 2024-11-22 RX ADMIN — SERTRALINE 50 MG: 50 TABLET, FILM COATED ORAL at 09:54

## 2024-11-22 NOTE — DISCHARGE SUMMARY
DISCHARGE SUMMARY      Patient ID:  Soren Allen  [id number]  19 y.o.  2005    Admit date: 11/18/2024    Discharge date and time: 11/22/2024    Disposition: Home     Admitting Physician: Faizan Canales MD     Discharge Physician: Dr MARK Canales MD    Admission Diagnoses: Depression with suicidal ideation [F32.A, R45.851]    Admission Condition: poor    Discharged Condition: stable    Admission Circumstance: Soren Allen is a 19 y.o. male who has a past medical history of anxiety and major depressive disorder. Patient presented to the ED, per [facility] documentation, Soren Allen is a 19 y.o. male who presents at the ED via [facility] on a pink slip due to suicidal ideations.     Per pink slip,   \"Clria has been screened by crisis 3X in past 24 hours due to severe depression and suicidal ideations. Mustapha was seen by NP of [facility] psychiatric urgent care and hospitalization was recommended. Mustapha presents with anhedonia, worthlessness, hopelessness, and helpless as he does not believe his mood circumstances will ever improve and expressing that he wants to die. Clt identified two methods that are highly lethal - collision w/ train and shooting self w/ firearm. While mustapha does not possess a firearm [place] to train tracks. Clt expressed that he is likely to act on suicidal urges in near future and was unable to identity reasons for living, available supports, and alternative behaviors. Clt is concerned how inpatient tx may impact school/work and has motive to minimize severity of risk. Clria has hx of three prior attempts with last occurring in 07/2024 and didn't continue with  tx after admission to [facility] then. Clt states there is no point is continuing to suffer as he does not believe positive change can occur. Mustapha is assessed as high risk to self and hospitalization is recommended.\"      Patient reports suicidal ideations. Patient stated he would use a gun or jump in front of a train to kill himself. Patient  [place], [place] - [address] [facility] - P [phone] - F [phone]  [address] [place] [address] [place] [address]      Phone: [phone]   hydrOXYzine HCl 50 MG tablet  sertraline 50 MG tablet  traZODone 50 MG tablet               Core Measures statement:   Not applicable      TIME SPENT - 35 MINUTES TO COMPLETE THE EVALUATION, DISCHARGE SUMMARY, MEDICATION RECONCILIATION AND FOLLOW UP CARE                                         Soren Allen is a 19 y.o. male being evaluated FACE TO FACE    --SIERRA CODY MD on 11/22/2024 at 9:53 AM    An electronic signature was used to authenticate this note.     **This report has been created using voice recognition software. It may contain minor errors which are inherent in voice recognition technology.**

## 2024-11-22 NOTE — PROGRESS NOTES
CLINICAL PHARMACY NOTE: MEDS TO BEDS    Total # of Prescriptions Filled: 3   The following medications were delivered to the patient:  Hydroxyzine HCL 50MG Tablets   Trazodone HCL 50MG Tablets  Sertraline HCL 50MG Tablets   Additional Documentation:  Delivered to [facility] Unit [facility] and signed for by Malia at 11:43AM 11/22/24

## 2024-11-22 NOTE — PLAN OF CARE
Problem: Depression/Self Harm  Goal: Effect of psychiatric condition will be minimized and patient will be protected from self harm  Description: INTERVENTIONS:  1. Assess impact of patient's symptoms on level of functioning, self care needs and offer support as indicated  2. Assess patient/family knowledge of depression, impact on illness and need for teaching  3. Provide emotional support, presence and reassurance  4. Assess for possible suicidal thoughts or ideation. If patient expresses suicidal thoughts or statements do not leave alone, initiate Suicide Precautions, move to a room close to the nursing station and obtain sitter  5. Initiate consults as appropriate with Mental Health Professional, Spiritual Care, Psychosocial CNS, and consider a recommendation to the LIP for a Psychiatric Consultation  11/21/2024 2057 by Amira Abbott RN  Outcome: Progressing     Problem: Pain  Goal: Verbalizes/displays adequate comfort level or baseline comfort level  11/21/2024 2057 by Amira Abbott, RN  Outcome: Progressing  Note: Patient is isolative to room reading a book most of shift. He reports improvement in overall mood. He denies suicidal ideation and thoughts of self harm. Staff maintains Q15 minute safety checks.

## 2024-11-22 NOTE — BH NOTE
[facility]  Discharge Note     Pt belongings: Retrieved from room/safe, reviewed and packed to take with.   Dental Appliances: None  Vision - Corrective Lenses: None  Hearing Aid: None  Jewelry: None  Body Piercings Removed: N/A  Clothing: Belt, Footwear, Jacket/Coat, Pants, Shirt, Socks, Undergarments  Other Valuables: Money, Wallet, Keys, Credit/Debit Card       Patient discharged to private residence, picked up by mom. Instructed on discharge instructions, pt verbalizes understanding and signs AVS. Pt in control at time of discharge. Pt ambulates to [facility] main entrance with psych staff x2. Rx  filled and sent with. No complaints voiced at this time.        Status EXAM upon discharge:  Mental Status and Behavioral Exam  Normal: No  Level of Assistance: Independent/Self  Facial Expression: Flat, Avoids Gaze  Affect: Blunt  Level of Consciousness: Alert  Frequency of Checks: 4 times per hour, close  Mood:Normal: No  Mood: Depressed  Motor Activity:Normal: No  Motor Activity: Decreased  Eye Contact: Fair  Observed Behavior: Cooperative, Guarded  Sexual Misconduct History: Current - no  Preception: Arabi to person, Arabi to time, Arabi to place  Attention:Normal: No  Attention: Distractible  Thought Processes: Circumstantial  Thought Content:Normal: No  Thought Content: Preoccupations  Depression Symptoms: No problems reported or observed.  Anxiety Symptoms: No problems reported or observed.  Naya Symptoms: Poor judgment  Hallucinations: None  Delusions: No  Memory:Normal: Yes  Insight and Judgment: No  Insight and Judgment: Poor judgment, Poor insight    Allison Mccain LPN

## 2024-11-22 NOTE — GROUP NOTE
Group Therapy Note    Date: 11/22/2024    Group Start Time: 0900  Group End Time: 0930  Group Topic: Nursing    Malia Esteban LPN        Group Therapy Note    Attendees: 9/19       patient refused to attend goals group at 0900 after encouragement from staff.  1:1 talk time provided as alternative to group session        Signature:  Malia Quintero LPN

## 2024-11-22 NOTE — GROUP NOTE
Group Therapy Note    Date: 11/21/2024    Group Start Time: 2010  Group End Time: 2040  Group Topic: Topic Group    Bernabe Dayl RN        Group Therapy Note    Attendees: 10    patient refused to attend ABCs of Coping Skills  group at 8PM  after encouragement from staff.  1:1 talk time was offered but declined as alternative to group session          Signature:  Bernabe Osei RN

## 2024-11-22 NOTE — TRANSITION OF CARE
Behavioral Health Transition Record    Patient Name: Soren Allen  YOB: 2005   Medical Record Number: [id number]  Date of Admission: 11/18/2024  5:27 PM   Date of Discharge: 11/22/24    Attending Provider: Faizan Canales MD   Discharging Provider: Faizan Canales MD  To contact this individual call [phone] and ask the  to page.  If unavailable, ask to be transferred to Behavioral Health Provider on call.  A Behavioral Health Provider will be available on call 24/7 and during holidays.    Primary Care Provider: No primary care provider on file.    No Known Allergies    Reason for Admission:  to Psychiatric Unit:  Threat to self requiring 24 hour professional observation  A mental disorder causing major disability in social, interpersonal, occupational, and/or educational functioning that is leading to dangerous or life-threatening functioning, and that can only be addressed in an acute inpatient setting   Concerns about patient's safety in the community     CHIEF COMPLAINT:  Depression with suicidal ideation    Admission Diagnosis: Depression with suicidal ideation [F32.A, R45.851]    * No surgery found *    Results for orders placed or performed during the hospital encounter of 11/18/24   CBC with Auto Differential   Result Value Ref Range    WBC 6.8 4.5 - 13.5 k/uL    RBC 4.62 4.5 - 5.9 m/uL    Hemoglobin 14.4 13.5 - 17.5 g/dL    Hematocrit 41.7 41 - 53 %    MCV 90.2 80 - 100 fL    MCH 31.1 26 - 34 pg    MCHC 34.5 31 - 37 g/dL    RDW 13.6 11.5 - 14.9 %    Platelets 299 150 - 450 k/uL    MPV 8.3 6.0 - 12.0 fL    Neutrophils % 50 34 - 64 %    Lymphocytes % 38 25 - 45 %    Monocytes % 7 2 - 8 %    Eosinophils % 4 0 - 4 %    Basophils % 1 0 - 2 %    Neutrophils Absolute 3.40 1.3 - 9.1 k/uL    Lymphocytes Absolute 2.60 1.0 - 4.8 k/uL    Monocytes Absolute 0.50 0.1 - 1.3 k/uL    Eosinophils Absolute 0.30 0.0 - 0.4 k/uL    Basophils Absolute 0.00 0.0 - 0.2 k/uL   CMP   Result Value Ref Range     Sodium 138 136 - 145 mmol/L    Potassium 4.2 3.7 - 5.3 mmol/L    Chloride 104 98 - 107 mmol/L    CO2 23 20 - 31 mmol/L    Anion Gap 11 9 - 16 mmol/L    Glucose 105 (H) 74 - 99 mg/dL    BUN 15 6 - 20 mg/dL    Creatinine 0.8 0.7 - 1.2 mg/dL    Est, Glom Filt Rate >90 >60 mL/min/1.73m2    Calcium 9.5 8.6 - 10.4 mg/dL    Total Protein 7.7 6.6 - 8.7 g/dL    Albumin 4.1 3.5 - 5.2 g/dL    Total Bilirubin <0.2 0.0 - 1.2 mg/dL    Alkaline Phosphatase 51 40 - 129 U/L    ALT 40 10 - 50 U/L    AST 36 10 - 50 U/L   Salicylate   Result Value Ref Range    Salicylate Lvl <1.0 0.0 - 10.0 mg/dL   Acetaminophen Level   Result Value Ref Range    Acetaminophen Level <5 (L) 10 - 30 ug/mL   ETOH   Result Value Ref Range    Ethanol Lvl <10 <10 mg/dL    Ethanol percent Can not be calculated <0.010 %   Magnesium   Result Value Ref Range    Magnesium 2.0 1.6 - 2.6 mg/dL   Urine Drug Screen   Result Value Ref Range    Amphetamine Screen, Ur NEGATIVE NEGATIVE    Barbiturate Screen, Ur NEGATIVE NEGATIVE    Benzodiazepine Screen, Urine NEGATIVE NEGATIVE    Cocaine Metabolite, Urine NEGATIVE NEGATIVE    Methadone Screen, Urine NEGATIVE NEGATIVE    Opiates, Urine NEGATIVE NEGATIVE    Phencyclidine, Urine NEGATIVE NEGATIVE    Cannabinoid Scrn, Ur NEGATIVE NEGATIVE    Oxycodone Screen, Ur NEGATIVE NEGATIVE    Fentanyl, Ur NEGATIVE NEGATIVE    Test Information       This method is a screening test to detect only these drug classes as part of a medical workup. Confirmatory testing by another method should be ordered if clinically indicated.       Immunizations administered during this encounter:   There is no immunization history on file for this patient.  Influenza Vaccination Status: Documentation of patient's or caregiver's refusal of influenza vaccine.    Screening for Metabolic Disorders for Patients on Antipsychotic Medications  (Data obtained from the EMR)    Estimated Body Mass Index  Body mass index is 30 kg/m².      Vital Signs/Blood

## 2024-11-22 NOTE — GROUP NOTE
Group Therapy Note    Date: 11/22/2024    Group Start Time: 1000  Group End Time: 1040  Group Topic: Psychoeducation    Marie Stoner, ERENDIRAW        Group Therapy Note    Attendees: 5/18       patient refused to attend psychoeducation group at 10a after encouragement from staff.  1:1 talk time provided as alternative to group session

## 2024-11-22 NOTE — DISCHARGE INSTRUCTIONS
Information:  Medications:   Medication summary provided   I understand that I should take only the medications on my list.     -why and when I need to take each medicine.     -which side effects to watch for.     -that I should carry my medication information at all times in case of     Emergency situations.    I will take all of my medicines to follow up appointments.     -check with my physician or pharmacist before taking any new    Medication, over the counter product or drink alcohol.    -Ask about food, drug or dietary supplement interactions.    -discard old lists and update records with medication providers.    Notify Physician:  Notify physician if you notice:   Always call 911 if you feel your life is in danger  In case of an emergency call 911 immediately!  If 911 is not available call your local emergency medical system for help    Behavioral Health Follow Up:  Original Referral Source:PPU  Discharge Diagnosis: Depression with suicidal ideation [F32.A, R45.851]  Recommendations for Level of Care: take medications, as ordered, keep follow up appt  Patient status at discharge: alert/oriented, medication compliant  [facility] hospital  was: [facility]  Aftercare plan faxed: [facility]/ [facility]   -faxed by: staff   -date: 11/22/24   -time: 1000  Prescriptions: filled at [facility] and sent with    Smoking: Quit Smoking.   Call the [employer]'s smoking quitline at 6-687-96L-QUIT  Know the signs of a heart attack   If you have any of the following symptoms call 911 immediately, do not wait more    Than five minutes.    1. Pressure, fullness and/ or squeezing in the center of the chest spreading to    The jaw, neck or shoulder.    2. Chest discomfort with light headedness, fainting, sweating, nausea or    Shortness of breath.   3. Upper abdominal pressure or discomfort.   4. Lower chest pain, back pain, unusual fatigue, shortness of breath, nausea   Or dizziness.     General Information:   Questions

## 2024-11-22 NOTE — CARE COORDINATION
Name: Soren Allen    : 2005    Auth number: [id number]     Discharge Date: 2024    Destination: moms home    *If you have any specific discharge questions, please contact the assigned /discharge planner: Raul [phone]      Discharge Medications:      Medication List        START taking these medications      hydrOXYzine HCl 50 MG tablet  Commonly known as: ATARAX  Take 1 tablet by mouth 3 times daily as needed for Anxiety  Notes to patient: Anxiety      sertraline 50 MG tablet  Commonly known as: ZOLOFT  Take 1 tablet by mouth daily  Notes to patient: Mood      traZODone 50 MG tablet  Commonly known as: DESYREL  Take 1 tablet by mouth nightly as needed for Sleep  Notes to patient: Sleep aid               Where to Get Your Medications        These medications were sent to [id number] - [address] -  [phone] - F [phone]  [address] [place] [address]      Phone: [phone]   hydrOXYzine HCl 50 MG tablet  sertraline 50 MG tablet  traZODone 50 MG tablet         Follow Up Appointment: [address] [address]  Phone: [phone]  Fax: [phone]  -  Phone: [phone]  Fax:  [phone]  Go to  You can WALK-IN to [facility]  Mon. - Fri, 8 am - 8 pm and Sat. - Sun., 9 am - 5 pm    [facility] - SUBSTANCE ABUSE [address]  [place] [address]  [phone]  Go on 2024  You have a mental health services appointment on  at 10am